# Patient Record
(demographics unavailable — no encounter records)

---

## 2025-05-22 NOTE — REVIEW OF SYSTEMS
[Negative] : Heme/Lymph [Dizziness] : no dizziness [Weakness] : no weakness [de-identified] : unsteadiness

## 2025-05-22 NOTE — HISTORY OF PRESENT ILLNESS
[FreeTextEntry1] : Mr. Hodges is an 82-year-old man with hypertension, syncope, CAD s/p PCI (RPDA), SSS s/p dual chamber PPM (MDT), persistent atrial fibrillation on Eliquis. Pt underwent unsuccessful cardioversion 4/20/25 and was started on amiodarone. He is pending repeat cardioversion 6/6/25. AF burden at 100% since ~12/2024, rate controlled. He denies palpitations, SOB/AGRAWAL. He reports "unsteadiness" which has been ongoing, occurs when he is standing.

## 2025-05-22 NOTE — ASSESSMENT
[FreeTextEntry1] : Mr. Hodges is an 82-year-old man with hypertension, syncope, CAD s/p PCI (RPDA), sinus node dysfunction s/p dual chamber PPM (MDT), persistent atrial fibrillation on Eliquis. Pt underwent unsuccessful cardioversion 4/20/25 and was started on amiodarone. He is pending repeat cardioversion 6/6/25.   persistent AFIB: - AF burden at 100% since ~12/2024, rate controlled  - OP amio load started 5/9, now amio 200mg daily- continue - pt aware of BBW of amiodarone: Will need yearly evaluation of thyroid function, Liver function, pulmonary function and eye exam. Sooner if symptoms develop.  Instructed the patient to immediately report any signs of toxicity including but not limited to breathlessness, non-productive cough, rapid weight loss/gain, fatigue, change in vision, darkening of urine, or jaundice. Advised to protect skin with sunscreen.   - discussed catheter ablation at length with patient for rhythm control after cardioversion   F/u with Dr. Bowles for ablation consults post [facility]

## 2025-05-22 NOTE — CARDIOLOGY SUMMARY
[de-identified] : 5.22.25: a sensed v paced [de-identified] : DCCV 4/30/2025 unsuccessful, multiple attempts made, afib sustained DCCV 3/20/2024   2.12.24 dual chamber PPM implant [de-identified] : [facility] 4/2024 PCI to RPDA